# Patient Record
Sex: FEMALE | Race: WHITE | Employment: FULL TIME | URBAN - NONMETROPOLITAN AREA
[De-identification: names, ages, dates, MRNs, and addresses within clinical notes are randomized per-mention and may not be internally consistent; named-entity substitution may affect disease eponyms.]

---

## 2023-04-21 ENCOUNTER — HOSPITAL ENCOUNTER (INPATIENT)
Age: 63
LOS: 2 days | Discharge: HOME | DRG: 247 | End: 2023-04-23
Attending: INTERNAL MEDICINE | Admitting: INTERNAL MEDICINE
Payer: COMMERCIAL

## 2023-04-21 PROBLEM — I21.3 STEMI (ST ELEVATION MYOCARDIAL INFARCTION) (HCC): Status: ACTIVE | Noted: 2023-04-21

## 2023-04-21 LAB
ACTIVATED CLOTTING TIME: 366 SECONDS (ref 1–150)
GLUCOSE BLD STRIP.AUTO-MCNC: 328 MG/DL (ref 70–108)
LV EF: 58 %
LVEF MODALITY: NORMAL
MRSA DNA SPEC QL NAA+PROBE: NEGATIVE
VANA ISLT/SPM QL: NEGATIVE

## 2023-04-21 PROCEDURE — 87500 VANOMYCIN DNA AMP PROBE: CPT

## 2023-04-21 PROCEDURE — 2580000003 HC RX 258: Performed by: INTERNAL MEDICINE

## 2023-04-21 PROCEDURE — C1887 CATHETER, GUIDING: HCPCS

## 2023-04-21 PROCEDURE — 93306 TTE W/DOPPLER COMPLETE: CPT

## 2023-04-21 PROCEDURE — 87070 CULTURE OTHR SPECIMN AEROBIC: CPT

## 2023-04-21 PROCEDURE — C1874 STENT, COATED/COV W/DEL SYS: HCPCS

## 2023-04-21 PROCEDURE — 85347 COAGULATION TIME ACTIVATED: CPT

## 2023-04-21 PROCEDURE — 6360000004 HC RX CONTRAST MEDICATION: Performed by: INTERNAL MEDICINE

## 2023-04-21 PROCEDURE — 027034Z DILATION OF CORONARY ARTERY, ONE ARTERY WITH DRUG-ELUTING INTRALUMINAL DEVICE, PERCUTANEOUS APPROACH: ICD-10-PCS | Performed by: INTERNAL MEDICINE

## 2023-04-21 PROCEDURE — 4A023N7 MEASUREMENT OF CARDIAC SAMPLING AND PRESSURE, LEFT HEART, PERCUTANEOUS APPROACH: ICD-10-PCS | Performed by: INTERNAL MEDICINE

## 2023-04-21 PROCEDURE — 99223 1ST HOSP IP/OBS HIGH 75: CPT | Performed by: INTERNAL MEDICINE

## 2023-04-21 PROCEDURE — 2100000000 HC CCU R&B

## 2023-04-21 PROCEDURE — C1894 INTRO/SHEATH, NON-LASER: HCPCS

## 2023-04-21 PROCEDURE — 92941 PRQ TRLML REVSC TOT OCCL AMI: CPT

## 2023-04-21 PROCEDURE — 6370000000 HC RX 637 (ALT 250 FOR IP): Performed by: INTERNAL MEDICINE

## 2023-04-21 PROCEDURE — C1725 CATH, TRANSLUMIN NON-LASER: HCPCS

## 2023-04-21 PROCEDURE — B2151ZZ FLUOROSCOPY OF LEFT HEART USING LOW OSMOLAR CONTRAST: ICD-10-PCS | Performed by: INTERNAL MEDICINE

## 2023-04-21 PROCEDURE — B2111ZZ FLUOROSCOPY OF MULTIPLE CORONARY ARTERIES USING LOW OSMOLAR CONTRAST: ICD-10-PCS | Performed by: INTERNAL MEDICINE

## 2023-04-21 PROCEDURE — 6370000000 HC RX 637 (ALT 250 FOR IP)

## 2023-04-21 PROCEDURE — 82948 REAGENT STRIP/BLOOD GLUCOSE: CPT

## 2023-04-21 PROCEDURE — 93458 L HRT ARTERY/VENTRICLE ANGIO: CPT

## 2023-04-21 PROCEDURE — 6360000002 HC RX W HCPCS

## 2023-04-21 PROCEDURE — 87641 MR-STAPH DNA AMP PROBE: CPT

## 2023-04-21 PROCEDURE — 92941 PRQ TRLML REVSC TOT OCCL AMI: CPT | Performed by: INTERNAL MEDICINE

## 2023-04-21 PROCEDURE — C1769 GUIDE WIRE: HCPCS

## 2023-04-21 PROCEDURE — 2500000003 HC RX 250 WO HCPCS

## 2023-04-21 PROCEDURE — 93458 L HRT ARTERY/VENTRICLE ANGIO: CPT | Performed by: INTERNAL MEDICINE

## 2023-04-21 RX ORDER — HYDROCHLOROTHIAZIDE 25 MG/1
25 TABLET ORAL DAILY
COMMUNITY
End: 2023-04-24 | Stop reason: SDUPTHER

## 2023-04-21 RX ORDER — SODIUM CHLORIDE 0.9 % (FLUSH) 0.9 %
5-40 SYRINGE (ML) INJECTION EVERY 12 HOURS SCHEDULED
Status: DISCONTINUED | OUTPATIENT
Start: 2023-04-21 | End: 2023-04-23 | Stop reason: HOSPADM

## 2023-04-21 RX ORDER — ASPIRIN 81 MG/1
81 TABLET ORAL DAILY
Status: DISCONTINUED | OUTPATIENT
Start: 2023-04-22 | End: 2023-04-23 | Stop reason: HOSPADM

## 2023-04-21 RX ORDER — SODIUM CHLORIDE 9 MG/ML
INJECTION, SOLUTION INTRAVENOUS PRN
Status: DISCONTINUED | OUTPATIENT
Start: 2023-04-21 | End: 2023-04-23 | Stop reason: HOSPADM

## 2023-04-21 RX ORDER — LISINOPRIL 20 MG/1
20 TABLET ORAL DAILY
Status: ON HOLD | COMMUNITY
End: 2023-04-23 | Stop reason: HOSPADM

## 2023-04-21 RX ORDER — INSULIN LISPRO 100 [IU]/ML
0-4 INJECTION, SOLUTION INTRAVENOUS; SUBCUTANEOUS NIGHTLY
Status: DISCONTINUED | OUTPATIENT
Start: 2023-04-21 | End: 2023-04-23 | Stop reason: HOSPADM

## 2023-04-21 RX ORDER — SODIUM CHLORIDE 9 MG/ML
INJECTION, SOLUTION INTRAVENOUS CONTINUOUS
Status: DISCONTINUED | OUTPATIENT
Start: 2023-04-21 | End: 2023-04-23 | Stop reason: HOSPADM

## 2023-04-21 RX ORDER — ACETAMINOPHEN 325 MG/1
650 TABLET ORAL EVERY 4 HOURS PRN
Status: DISCONTINUED | OUTPATIENT
Start: 2023-04-21 | End: 2023-04-23 | Stop reason: HOSPADM

## 2023-04-21 RX ORDER — INSULIN LISPRO 100 [IU]/ML
0-8 INJECTION, SOLUTION INTRAVENOUS; SUBCUTANEOUS
Status: DISCONTINUED | OUTPATIENT
Start: 2023-04-22 | End: 2023-04-23 | Stop reason: HOSPADM

## 2023-04-21 RX ORDER — SODIUM CHLORIDE 0.9 % (FLUSH) 0.9 %
5-40 SYRINGE (ML) INJECTION PRN
Status: DISCONTINUED | OUTPATIENT
Start: 2023-04-21 | End: 2023-04-23 | Stop reason: HOSPADM

## 2023-04-21 RX ADMIN — IOPAMIDOL 100 ML: 755 INJECTION, SOLUTION INTRAVENOUS at 14:20

## 2023-04-21 RX ADMIN — TICAGRELOR 90 MG: 90 TABLET ORAL at 21:38

## 2023-04-21 RX ADMIN — METOPROLOL TARTRATE 25 MG: 25 TABLET, FILM COATED ORAL at 21:38

## 2023-04-21 RX ADMIN — SODIUM CHLORIDE, PRESERVATIVE FREE 10 ML: 5 INJECTION INTRAVENOUS at 21:39

## 2023-04-21 RX ADMIN — INSULIN LISPRO 4 UNITS: 100 INJECTION, SOLUTION INTRAVENOUS; SUBCUTANEOUS at 21:39

## 2023-04-21 RX ADMIN — SODIUM CHLORIDE: 9 INJECTION, SOLUTION INTRAVENOUS at 22:30

## 2023-04-21 RX ADMIN — SODIUM CHLORIDE: 9 INJECTION, SOLUTION INTRAVENOUS at 16:45

## 2023-04-22 LAB
ANION GAP SERPL CALC-SCNC: 10 MEQ/L (ref 8–16)
BUN SERPL-MCNC: 13 MG/DL (ref 7–22)
CALCIUM SERPL-MCNC: 8.5 MG/DL (ref 8.5–10.5)
CHLORIDE SERPL-SCNC: 97 MEQ/L (ref 98–111)
CO2 SERPL-SCNC: 24 MEQ/L (ref 23–33)
CREAT SERPL-MCNC: 0.5 MG/DL (ref 0.4–1.2)
DEPRECATED RDW RBC AUTO: 43.3 FL (ref 35–45)
ERYTHROCYTE [DISTWIDTH] IN BLOOD BY AUTOMATED COUNT: 12.4 % (ref 11.5–14.5)
GFR SERPL CREATININE-BSD FRML MDRD: > 60 ML/MIN/1.73M2
GLUCOSE BLD STRIP.AUTO-MCNC: 122 MG/DL (ref 70–108)
GLUCOSE BLD STRIP.AUTO-MCNC: 218 MG/DL (ref 70–108)
GLUCOSE BLD STRIP.AUTO-MCNC: 246 MG/DL (ref 70–108)
GLUCOSE BLD STRIP.AUTO-MCNC: 270 MG/DL (ref 70–108)
GLUCOSE SERPL-MCNC: 250 MG/DL (ref 70–108)
HCT VFR BLD AUTO: 47.3 % (ref 37–47)
HGB BLD-MCNC: 15.6 GM/DL (ref 12–16)
MCH RBC QN AUTO: 31.4 PG (ref 26–33)
MCHC RBC AUTO-ENTMCNC: 33 GM/DL (ref 32.2–35.5)
MCV RBC AUTO: 95.2 FL (ref 81–99)
PLATELET # BLD AUTO: 255 THOU/MM3 (ref 130–400)
PMV BLD AUTO: 10.2 FL (ref 9.4–12.4)
POTASSIUM SERPL-SCNC: 4 MEQ/L (ref 3.5–5.2)
RBC # BLD AUTO: 4.97 MILL/MM3 (ref 4.2–5.4)
SODIUM SERPL-SCNC: 131 MEQ/L (ref 135–145)
WBC # BLD AUTO: 10.9 THOU/MM3 (ref 4.8–10.8)

## 2023-04-22 PROCEDURE — 6370000000 HC RX 637 (ALT 250 FOR IP): Performed by: STUDENT IN AN ORGANIZED HEALTH CARE EDUCATION/TRAINING PROGRAM

## 2023-04-22 PROCEDURE — 6370000000 HC RX 637 (ALT 250 FOR IP): Performed by: INTERNAL MEDICINE

## 2023-04-22 PROCEDURE — 80048 BASIC METABOLIC PNL TOTAL CA: CPT

## 2023-04-22 PROCEDURE — 2580000003 HC RX 258: Performed by: INTERNAL MEDICINE

## 2023-04-22 PROCEDURE — 99232 SBSQ HOSP IP/OBS MODERATE 35: CPT | Performed by: STUDENT IN AN ORGANIZED HEALTH CARE EDUCATION/TRAINING PROGRAM

## 2023-04-22 PROCEDURE — 2140000000 HC CCU INTERMEDIATE R&B

## 2023-04-22 PROCEDURE — 85027 COMPLETE CBC AUTOMATED: CPT

## 2023-04-22 PROCEDURE — 82948 REAGENT STRIP/BLOOD GLUCOSE: CPT

## 2023-04-22 PROCEDURE — 36415 COLL VENOUS BLD VENIPUNCTURE: CPT

## 2023-04-22 RX ORDER — CALCIUM CARBONATE 200(500)MG
500 TABLET,CHEWABLE ORAL 3 TIMES DAILY PRN
Status: DISCONTINUED | OUTPATIENT
Start: 2023-04-22 | End: 2023-04-23 | Stop reason: HOSPADM

## 2023-04-22 RX ORDER — INSULIN LISPRO 100 [IU]/ML
8 INJECTION, SOLUTION INTRAVENOUS; SUBCUTANEOUS ONCE
Status: COMPLETED | OUTPATIENT
Start: 2023-04-22 | End: 2023-04-22

## 2023-04-22 RX ADMIN — METOPROLOL TARTRATE 25 MG: 25 TABLET, FILM COATED ORAL at 20:19

## 2023-04-22 RX ADMIN — SODIUM CHLORIDE, PRESERVATIVE FREE 10 ML: 5 INJECTION INTRAVENOUS at 07:44

## 2023-04-22 RX ADMIN — ANTACID TABLETS 500 MG: 500 TABLET, CHEWABLE ORAL at 11:28

## 2023-04-22 RX ADMIN — TICAGRELOR 90 MG: 90 TABLET ORAL at 08:04

## 2023-04-22 RX ADMIN — INSULIN LISPRO 2 UNITS: 100 INJECTION, SOLUTION INTRAVENOUS; SUBCUTANEOUS at 16:47

## 2023-04-22 RX ADMIN — ASPIRIN 81 MG: 81 TABLET, COATED ORAL at 08:04

## 2023-04-22 RX ADMIN — INSULIN LISPRO 2 UNITS: 100 INJECTION, SOLUTION INTRAVENOUS; SUBCUTANEOUS at 08:03

## 2023-04-22 RX ADMIN — SODIUM CHLORIDE: 9 INJECTION, SOLUTION INTRAVENOUS at 16:49

## 2023-04-22 RX ADMIN — ANTACID TABLETS 500 MG: 500 TABLET, CHEWABLE ORAL at 20:19

## 2023-04-22 RX ADMIN — SODIUM CHLORIDE: 9 INJECTION, SOLUTION INTRAVENOUS at 07:44

## 2023-04-22 RX ADMIN — INSULIN LISPRO 8 UNITS: 100 INJECTION, SOLUTION INTRAVENOUS; SUBCUTANEOUS at 14:08

## 2023-04-22 RX ADMIN — TICAGRELOR 90 MG: 90 TABLET ORAL at 20:19

## 2023-04-22 RX ADMIN — SODIUM CHLORIDE, PRESERVATIVE FREE 10 ML: 5 INJECTION INTRAVENOUS at 20:22

## 2023-04-22 RX ADMIN — SODIUM CHLORIDE: 9 INJECTION, SOLUTION INTRAVENOUS at 18:00

## 2023-04-22 RX ADMIN — METOPROLOL TARTRATE 25 MG: 25 TABLET, FILM COATED ORAL at 11:27

## 2023-04-23 VITALS
TEMPERATURE: 97.7 F | WEIGHT: 246 LBS | BODY MASS INDEX: 39.53 KG/M2 | OXYGEN SATURATION: 95 % | HEART RATE: 68 BPM | HEIGHT: 66 IN | SYSTOLIC BLOOD PRESSURE: 153 MMHG | RESPIRATION RATE: 16 BRPM | DIASTOLIC BLOOD PRESSURE: 69 MMHG

## 2023-04-23 LAB
BACTERIA SPEC AEROBE CULT: NORMAL
GLUCOSE BLD STRIP.AUTO-MCNC: 144 MG/DL (ref 70–108)

## 2023-04-23 PROCEDURE — 6370000000 HC RX 637 (ALT 250 FOR IP): Performed by: STUDENT IN AN ORGANIZED HEALTH CARE EDUCATION/TRAINING PROGRAM

## 2023-04-23 PROCEDURE — 82948 REAGENT STRIP/BLOOD GLUCOSE: CPT

## 2023-04-23 PROCEDURE — 6370000000 HC RX 637 (ALT 250 FOR IP): Performed by: INTERNAL MEDICINE

## 2023-04-23 PROCEDURE — 99232 SBSQ HOSP IP/OBS MODERATE 35: CPT | Performed by: STUDENT IN AN ORGANIZED HEALTH CARE EDUCATION/TRAINING PROGRAM

## 2023-04-23 RX ORDER — METOPROLOL SUCCINATE 25 MG/1
25 TABLET, EXTENDED RELEASE ORAL DAILY
Qty: 30 TABLET | Refills: 3 | Status: SHIPPED | OUTPATIENT
Start: 2023-04-23

## 2023-04-23 RX ORDER — ATORVASTATIN CALCIUM 40 MG/1
40 TABLET, FILM COATED ORAL DAILY
Qty: 30 TABLET | Refills: 3 | Status: SHIPPED | OUTPATIENT
Start: 2023-04-23

## 2023-04-23 RX ORDER — NITROGLYCERIN 0.4 MG/1
0.4 TABLET SUBLINGUAL EVERY 5 MIN PRN
Qty: 25 TABLET | Refills: 3 | Status: SHIPPED | OUTPATIENT
Start: 2023-04-23

## 2023-04-23 RX ORDER — ASPIRIN 81 MG/1
81 TABLET ORAL DAILY
Qty: 30 TABLET | Refills: 3 | Status: SHIPPED | OUTPATIENT
Start: 2023-04-23

## 2023-04-23 RX ADMIN — ASPIRIN 81 MG: 81 TABLET, COATED ORAL at 09:21

## 2023-04-23 RX ADMIN — METOPROLOL TARTRATE 25 MG: 25 TABLET, FILM COATED ORAL at 09:21

## 2023-04-23 RX ADMIN — TICAGRELOR 90 MG: 90 TABLET ORAL at 09:21

## 2023-04-24 ENCOUNTER — TELEPHONE (OUTPATIENT)
Dept: CARDIAC REHAB | Age: 63
End: 2023-04-24

## 2023-04-24 RX ORDER — HYDROCHLOROTHIAZIDE 25 MG/1
25 TABLET ORAL DAILY
Qty: 30 TABLET | Refills: 4 | Status: SHIPPED | OUTPATIENT
Start: 2023-04-24 | End: 2023-04-26 | Stop reason: SDUPTHER

## 2023-04-24 NOTE — TELEPHONE ENCOUNTER
Ji Romero called requesting a refill on the following medications:  Requested Prescriptions     Pending Prescriptions Disp Refills    hydroCHLOROthiazide (HYDRODIURIL) 25 MG tablet 30 tablet      Sig: Take 1 tablet by mouth daily With lisinopril     Pharmacy verified:  .pv  [facility]    Patient was seen in the hospital and was told someone would be calling her to schedule a f/u appt with Nallu. Her daughter said she is supposed to see him within 1 wk. Please advise    Pts daughter said that there were changes to pts med made in hospital. She has the hydrochlorothiazide but its combined with lisinopril which she is supposed to d/c. So she is needing a script for just they hydrochlorothiazide.

## 2023-04-26 ENCOUNTER — OFFICE VISIT (OUTPATIENT)
Dept: CARDIOLOGY CLINIC | Age: 63
End: 2023-04-26
Payer: COMMERCIAL

## 2023-04-26 VITALS
DIASTOLIC BLOOD PRESSURE: 74 MMHG | SYSTOLIC BLOOD PRESSURE: 146 MMHG | WEIGHT: 246 LBS | HEIGHT: 66 IN | HEART RATE: 81 BPM | BODY MASS INDEX: 39.53 KG/M2

## 2023-04-26 DIAGNOSIS — I10 PRIMARY HYPERTENSION: ICD-10-CM

## 2023-04-26 DIAGNOSIS — I21.21 ST ELEVATION MYOCARDIAL INFARCTION INVOLVING LEFT CIRCUMFLEX CORONARY ARTERY (HCC): Primary | ICD-10-CM

## 2023-04-26 PROCEDURE — 99214 OFFICE O/P EST MOD 30 MIN: CPT | Performed by: STUDENT IN AN ORGANIZED HEALTH CARE EDUCATION/TRAINING PROGRAM

## 2023-04-26 PROCEDURE — 3078F DIAST BP <80 MM HG: CPT | Performed by: STUDENT IN AN ORGANIZED HEALTH CARE EDUCATION/TRAINING PROGRAM

## 2023-04-26 PROCEDURE — 3077F SYST BP >= 140 MM HG: CPT | Performed by: STUDENT IN AN ORGANIZED HEALTH CARE EDUCATION/TRAINING PROGRAM

## 2023-04-26 RX ORDER — HYDROCHLOROTHIAZIDE 25 MG/1
25 TABLET ORAL DAILY
Qty: 30 TABLET | Refills: 4 | Status: SHIPPED | OUTPATIENT
Start: 2023-04-26

## 2023-04-26 RX ORDER — CLOPIDOGREL BISULFATE 75 MG/1
75 TABLET ORAL DAILY
Qty: 30 TABLET | Refills: 11 | Status: SHIPPED | OUTPATIENT
Start: 2023-05-16

## 2023-04-26 RX ORDER — CLOPIDOGREL 300 MG/1
600 TABLET, FILM COATED ORAL DAILY
Qty: 2 TABLET | Refills: 0 | Status: SHIPPED | OUTPATIENT
Start: 2023-05-15

## 2023-04-26 NOTE — PROGRESS NOTES
[facility]  [facility]   [address]   [address]   [address] [address]   Dept: [phone]   Dept Fax: [phone]: [phone]      Chief Complaint   Patient presents with    Follow-Up from Hospital     Was in [address] for chest pressure that pt is still having but she has had GI issues so not sure if its related to that        Cardiologist:  Dr. Mady Somers  57 yo female presents for f/u of STEMI, s/p PCI to Lcx. Newly diagnosed DM, was started on metformin by PCP. Finally had a bowel movement, had been taking a stool softener for a couple days prior to this. No further chest pain. No sob or dizziness, no swelling or weight gain recently. Would prefer to refer to [facility]. General:   No fever, no chills, no weight loss, no fatigue  Pulmonary:    No dyspnea, no wheezing  Cardiac:    Denies recent chest pain   GI:     No nausea or vomiting, no abdominal pain  Neuro:      No dizziness or light headedness  Musculoskeletal:  No recent active issues  Extremities:   No edema      No past medical history on file. Allergies   Allergen Reactions    Aldomet [Methyldopa] Shortness Of Breath       Current Outpatient Medications   Medication Sig Dispense Refill    hydroCHLOROthiazide (HYDRODIURIL) 25 MG tablet Take 1 tablet by mouth daily With lisinopril 30 tablet 4    metoprolol succinate (TOPROL XL) 25 MG extended release tablet Take 1 tablet by mouth daily 30 tablet 3    aspirin 81 MG EC tablet Take 1 tablet by mouth daily 30 tablet 3    ticagrelor (BRILINTA) 90 MG TABS tablet Take 1 tablet by mouth 2 times daily 60 tablet 11    nitroGLYCERIN (NITROSTAT) 0.4 MG SL tablet Place 1 tablet under the tongue every 5 minutes as needed for Chest pain up to max of 3 total doses.  If no relief after 1 dose, call 911. 25 tablet 3    atorvastatin (LIPITOR) 40 MG tablet Take 1 tablet by mouth daily 30 tablet 3    metFORMIN (GLUCOPHAGE) 500 MG tablet Take 1

## 2023-04-26 NOTE — PATIENT INSTRUCTIONS
Take brilinta until it runs out. 1st day after brilinta: take 600 mg of plavix x 1 day. After this, take plavix 75 mg daily. All other meds stay the same.
16

## 2023-04-28 LAB
CHOLESTEROL, TOTAL: 135 MG/DL
CHOLESTEROL/HDL RATIO: 3.8
HDLC SERPL-MCNC: 36 MG/DL (ref 35–70)
LDL CHOLESTEROL CALCULATED: 73 MG/DL (ref 0–160)
NONHDLC SERPL-MCNC: NORMAL MG/DL
TRIGL SERPL-MCNC: 130 MG/DL
VLDLC SERPL CALC-MCNC: 26 MG/DL

## 2023-05-15 RX ORDER — METOPROLOL SUCCINATE 25 MG/1
TABLET, EXTENDED RELEASE ORAL
Qty: 30 TABLET | Refills: 3 | Status: SHIPPED | OUTPATIENT
Start: 2023-05-15

## 2023-05-15 RX ORDER — ATORVASTATIN CALCIUM 40 MG/1
TABLET, FILM COATED ORAL
Qty: 30 TABLET | Refills: 3 | OUTPATIENT
Start: 2023-05-15

## 2023-05-15 RX ORDER — SALICYLIC ACID 40 %
ADHESIVE PATCH, MEDICATED TOPICAL
Qty: 30 TABLET | Refills: 3 | OUTPATIENT
Start: 2023-05-15

## 2023-05-25 RX ORDER — CLOPIDOGREL BISULFATE 75 MG/1
TABLET ORAL
Qty: 30 TABLET | Refills: 11 | OUTPATIENT
Start: 2023-05-25

## 2023-08-14 RX ORDER — SALICYLIC ACID 40 %
ADHESIVE PATCH, MEDICATED TOPICAL
Qty: 90 TABLET | Refills: 1 | OUTPATIENT
Start: 2023-08-14

## 2023-08-14 RX ORDER — ATORVASTATIN CALCIUM 40 MG/1
TABLET, FILM COATED ORAL
Qty: 90 TABLET | Refills: 1 | OUTPATIENT
Start: 2023-08-14

## 2023-08-15 RX ORDER — METOPROLOL SUCCINATE 25 MG/1
TABLET, EXTENDED RELEASE ORAL
Qty: 90 TABLET | Refills: 1 | OUTPATIENT
Start: 2023-08-15

## 2023-08-15 RX ORDER — HYDROCHLOROTHIAZIDE 25 MG/1
TABLET ORAL
Qty: 90 TABLET | Refills: 1 | OUTPATIENT
Start: 2023-08-15

## 2023-09-18 RX ORDER — HYDROCHLOROTHIAZIDE 25 MG/1
25 TABLET ORAL DAILY
Qty: 90 TABLET | Refills: 1 | OUTPATIENT
Start: 2023-09-18

## 2023-09-18 NOTE — TELEPHONE ENCOUNTER
Patient follows with Dr. Zac Brumfield. LM for patient to make sure she is able to get her prescriptions from Dr. Zac Brumfield.

## 2024-05-10 RX ORDER — CLOPIDOGREL BISULFATE 75 MG/1
75 TABLET ORAL DAILY
Qty: 90 TABLET | Refills: 3 | OUTPATIENT
Start: 2024-05-10